# Patient Record
Sex: MALE | Race: OTHER | Employment: FULL TIME | URBAN - NONMETROPOLITAN AREA
[De-identification: names, ages, dates, MRNs, and addresses within clinical notes are randomized per-mention and may not be internally consistent; named-entity substitution may affect disease eponyms.]

---

## 2021-09-10 ENCOUNTER — APPOINTMENT (OUTPATIENT)
Dept: GENERAL RADIOLOGY | Age: 31
End: 2021-09-10
Payer: COMMERCIAL

## 2021-09-10 ENCOUNTER — HOSPITAL ENCOUNTER (EMERGENCY)
Age: 31
Discharge: HOME | End: 2021-09-10
Attending: FAMILY MEDICINE
Payer: COMMERCIAL

## 2021-09-10 VITALS
DIASTOLIC BLOOD PRESSURE: 77 MMHG | RESPIRATION RATE: 16 BRPM | OXYGEN SATURATION: 99 % | BODY MASS INDEX: 21.98 KG/M2 | TEMPERATURE: 97.4 F | HEART RATE: 69 BPM | SYSTOLIC BLOOD PRESSURE: 133 MMHG | HEIGHT: 68 IN | WEIGHT: 145 LBS

## 2021-09-10 DIAGNOSIS — S20.211A RIB CONTUSION, RIGHT, INITIAL ENCOUNTER: Primary | ICD-10-CM

## 2021-09-10 PROCEDURE — 71101 X-RAY EXAM UNILAT RIBS/CHEST: CPT

## 2021-09-10 PROCEDURE — 99283 EMERGENCY DEPT VISIT LOW MDM: CPT

## 2021-09-10 ASSESSMENT — PAIN DESCRIPTION - LOCATION
LOCATION: RIB CAGE
LOCATION: RIB CAGE

## 2021-09-10 ASSESSMENT — ENCOUNTER SYMPTOMS
SHORTNESS OF BREATH: 0
VOMITING: 0
NAUSEA: 0
COUGH: 0
ABDOMINAL PAIN: 0

## 2021-09-10 ASSESSMENT — PAIN SCALES - GENERAL
PAINLEVEL_OUTOF10: 4
PAINLEVEL_OUTOF10: 2

## 2021-09-10 ASSESSMENT — PAIN DESCRIPTION - ORIENTATION
ORIENTATION: RIGHT;LOWER
ORIENTATION: RIGHT

## 2021-09-10 NOTE — ED NOTES
Pt presents w/ c/o right lower anterior rib pain that is radiating to his right lower posterior ribs. States that he was hit w/ a box at work. Pain w/ movement and deep breaths. Respirations regular and easy. Lungs sound clear t/o.       Hannah Tan RN  09/10/21 6070

## 2021-09-10 NOTE — ED PROVIDER NOTES
[facility]  eMERGENCY dEPARTMENT eNCOUnter          CHIEF COMPLAINT       Chief Complaint   Patient presents with    Rib Injury     right lower       Nurses Notes reviewed and I agree except as noted in the HPI. HISTORY OF PRESENT ILLNESS    Sunshine Peterson is a 32 y.o. male who presents with right rib pain. Patient notes struck by box in right front rib area. Notes pain with movement. Denies shortness of breath. Pain moderate in intensity. Denies alleviating measures. Incident occurred at work today just prior to arrival.        REVIEW OF SYSTEMS     Review of Systems   Constitutional: Positive for activity change. Negative for chills and fever. Respiratory: Negative for cough and shortness of breath. Gastrointestinal: Negative for abdominal pain, nausea and vomiting. Musculoskeletal: Positive for arthralgias (right anterior lower rib pain ). Skin: Negative for rash and wound. Psychiatric/Behavioral: Negative for agitation and behavioral problems. All other systems reviewed and are negative. PAST MEDICAL HISTORY    has a past medical history of H. pylori infection. SURGICAL HISTORY      has no past surgical history on file. CURRENT MEDICATIONS       Previous Medications    No medications on file       ALLERGIES     has No Known Allergies. FAMILY HISTORY     has no family status information on file. family history is not on file. SOCIAL HISTORY      reports that he has never smoked. He has never used smokeless tobacco. He reports that he does not drink alcohol and does not use drugs. PHYSICAL EXAM     INITIAL VITALS:  height is 5' 7.5\" (1.715 m) and weight is 145 lb (65.8 kg). His temporal temperature is 97.4 °F (36.3 °C). His blood pressure is 133/77 and his pulse is 69. His respiration is 16 and oxygen saturation is 99%. Physical Exam  Vitals and nursing note reviewed. Constitutional:       General: He is not in acute distress.   HENT:      Head: Normocephalic and atraumatic. Pulmonary:      Effort: Pulmonary effort is normal. No respiratory distress. Breath sounds: Normal breath sounds. No wheezing. Chest:      Chest wall: Tenderness (right anterior rib pain, rib 6,7.8 mid clavicle line) present. Musculoskeletal:         General: Tenderness and signs of injury present. No swelling or deformity. Comments: No crepitus. Skin:     General: Skin is dry. Findings: No bruising or rash. Neurological:      Mental Status: He is alert. Psychiatric:         Mood and Affect: Mood normal.         Behavior: Behavior normal.         DIFFERENTIAL DIAGNOSIS:   Rib contusion,rib fracture, pneumothorax    DIAGNOSTIC RESULTS     EKG: All EKG's are interpreted by the Emergency Department Physician who either signs or Co-signs this chart in the absence of a cardiologist.      RADIOLOGY: non-plain film images(s) such as CT, Ultrasound and MRI are read by the radiologist.      XR RIBS RIGHT INCLUDE CHEST (MIN 3 VIEWS) (Final result)  Result time 09/10/21 11:24:35  Final result by Hina Martin MD (09/10/21 11:24:35)                Impression:    No acute abnormalities         **This report has been created using voice recognition software.  It may contain minor errors which are inherent in voice recognition technology. **     Final report electronically signed by Dr. Kartik Stern on 9/10/2021 11:24 AM            Narrative:    PROCEDURE: XR RIBS RIGHT INCLUDE CHEST (MIN 3 VIEWS)     CLINICAL INFORMATION: right anterior lower rib pain. ribs 6,7 mid clavicle line. .     COMPARISON: No prior study. TECHNIQUE: PA chest, 4 projections right rib     FINDINGS: Heart, mediastinal and hilar contours are unremarkable. There are no infiltrates or effusions. There is no pneumothorax. There is no rib fracture.                   LABS:   Labs Reviewed - No data to display    EMERGENCY DEPARTMENT COURSE:   Vitals:    Vitals:    09/10/21 1048   BP: 133/77   Pulse: 69   Resp: 16   Temp: 97.4 °F (36.3 °C)   TempSrc: Temporal   SpO2: 99%   Weight: 145 lb (65.8 kg)   Height: 5' 7.5\" (1.715 m)     Patient speaks french [place] however does have friend who is able to interpret. Notes right anterior rib pain. On exam pain at right mid clavicle line ribs 6-8. No bruising. No crepitus. Lungs clear. X ray obtained and show no fracture,no infiltrates,no pneumothorax. At this time restrictions provided for work. Return in 3 days to have restrictions reviewed. Care instructions provided. Recommending Tylenol or OTC motrin for pain. CRITICAL CARE:       CONSULTS:      PROCEDURES:  None    FINAL IMPRESSION      1. Rib contusion, right, initial encounter          DISPOSITION/PLAN   Home. Care instructions provided. Follow up with PCP or ED as needed.      PATIENT REFERRED TO:  [address] [place] [address] [address] [facility]  [phone]  Go in 3 days  at occupational clinic at [address]:  New Prescriptions    No medications on file       (Please note that portions of this note were completed with a voice recognition program.  Efforts were made to edit the dictations but occasionally words are mis-transcribed.)    MD Tk Madrigal MD  09/10/21 3178

## 2021-09-10 NOTE — ED NOTES
Pt alert and oriented. Respirations regular and easy. Discharge instructions reviewed. States understanding. Pt discharged in satisfactory condition.        Anastasiya Cota RN  09/10/21 1761

## 2021-09-13 ENCOUNTER — HOSPITAL ENCOUNTER (OUTPATIENT)
Dept: GENERAL RADIOLOGY | Age: 31
End: 2021-09-13

## 2021-10-13 ENCOUNTER — HOSPITAL ENCOUNTER (OUTPATIENT)
Dept: CT IMAGING | Age: 31
Discharge: HOME | End: 2021-10-13
Payer: COMMERCIAL

## 2021-10-13 DIAGNOSIS — S20.211A CONTUSION OF RIGHT FRONT WALL OF THORAX, INITIAL ENCOUNTER: ICD-10-CM

## 2021-10-13 PROCEDURE — 74150 CT ABDOMEN W/O CONTRAST: CPT
